# Patient Record
Sex: MALE | Race: WHITE | URBAN - METROPOLITAN AREA
[De-identification: names, ages, dates, MRNs, and addresses within clinical notes are randomized per-mention and may not be internally consistent; named-entity substitution may affect disease eponyms.]

---

## 2020-07-25 ENCOUNTER — TELEPHONE ENCOUNTER (OUTPATIENT)
Dept: URBAN - METROPOLITAN AREA CLINIC 13 | Facility: CLINIC | Age: 45
End: 2020-07-25

## 2020-07-26 ENCOUNTER — TELEPHONE ENCOUNTER (OUTPATIENT)
Dept: URBAN - METROPOLITAN AREA CLINIC 13 | Facility: CLINIC | Age: 45
End: 2020-07-26

## 2020-07-26 RX ORDER — TESTOSTERONE 16.2 MG/G
GEL TRANSDERMAL
Qty: 150 | Refills: 0 | Status: ACTIVE | COMMUNITY
Start: 2020-04-08

## 2020-07-26 RX ORDER — POLYETHYLENE GLYCOL 3350, SODIUM CHLORIDE, SODIUM BICARBONATE AND POTASSIUM CHLORIDE WITH LEMON FLAVOR 420; 11.2; 5.72; 1.48 G/4L; G/4L; G/4L; G/4L
TAKE  ML AS DIRECTED MIX CONTENTS PER DIRECTIONS, BEGIN DRINKING 1/2 SOLUTION @ 5P, COMPLETE REMAINDER OF SOLUTION 6HR PRIOR TO PROCEDURE POWDER, FOR SOLUTION ORAL
Qty: 1 | Refills: 0 | Status: ACTIVE | COMMUNITY
Start: 2020-02-18

## 2020-07-26 RX ORDER — TESTOSTERONE 16.2 MG/G
GEL TRANSDERMAL
Qty: 75 | Refills: 0 | Status: ACTIVE | COMMUNITY
Start: 2020-02-10

## 2020-07-26 RX ORDER — ERGOCALCIFEROL 1.25 MG/1
TAKE 1 CAPULE ONCE MONTHLY CAPSULE ORAL
Refills: 0 | Status: ACTIVE | COMMUNITY
Start: 2020-02-04

## 2020-07-26 RX ORDER — TESTOSTERONE 16.2 MG/G
GEL TRANSDERMAL
Qty: 75 | Refills: 0 | Status: ACTIVE | COMMUNITY
Start: 2020-03-10

## 2020-07-26 RX ORDER — CHLORHEXIDINE GLUCONATE 4 %
TAKE 1 TABLET DAILY AS DIRECTED LIQUID (ML) TOPICAL
Refills: 0 | Status: ACTIVE | COMMUNITY
Start: 2020-02-04

## 2021-08-20 ENCOUNTER — OFFICE VISIT (OUTPATIENT)
Dept: URBAN - METROPOLITAN AREA CLINIC 107 | Facility: CLINIC | Age: 46
End: 2021-08-20

## 2021-09-23 ENCOUNTER — OFFICE VISIT (OUTPATIENT)
Dept: URBAN - METROPOLITAN AREA CLINIC 107 | Facility: CLINIC | Age: 46
End: 2021-09-23

## 2021-09-23 RX ORDER — POLYETHYLENE GLYCOL 3350, SODIUM CHLORIDE, SODIUM BICARBONATE AND POTASSIUM CHLORIDE WITH LEMON FLAVOR 420; 11.2; 5.72; 1.48 G/4L; G/4L; G/4L; G/4L
TAKE  ML AS DIRECTED MIX CONTENTS PER DIRECTIONS, BEGIN DRINKING 1/2 SOLUTION @ 5P, COMPLETE REMAINDER OF SOLUTION 6HR PRIOR TO PROCEDURE POWDER, FOR SOLUTION ORAL
Qty: 1 | Refills: 0 | Status: ACTIVE | COMMUNITY
Start: 2020-02-18

## 2021-09-23 RX ORDER — CHLORHEXIDINE GLUCONATE 4 %
TAKE 1 TABLET DAILY AS DIRECTED LIQUID (ML) TOPICAL
Refills: 0 | Status: ACTIVE | COMMUNITY
Start: 2020-02-04

## 2021-09-23 RX ORDER — ERGOCALCIFEROL 1.25 MG/1
TAKE 1 CAPULE ONCE MONTHLY CAPSULE ORAL
Refills: 0 | Status: ACTIVE | COMMUNITY
Start: 2020-02-04

## 2021-09-23 RX ORDER — TESTOSTERONE 16.2 MG/G
GEL TRANSDERMAL
Qty: 75 | Refills: 0 | Status: ACTIVE | COMMUNITY
Start: 2020-02-10

## 2021-10-26 ENCOUNTER — OFFICE VISIT (OUTPATIENT)
Dept: URBAN - METROPOLITAN AREA CLINIC 113 | Facility: CLINIC | Age: 46
End: 2021-10-26
Payer: COMMERCIAL

## 2021-10-26 ENCOUNTER — WEB ENCOUNTER (OUTPATIENT)
Dept: URBAN - METROPOLITAN AREA CLINIC 113 | Facility: CLINIC | Age: 46
End: 2021-10-26

## 2021-10-26 ENCOUNTER — TELEPHONE ENCOUNTER (OUTPATIENT)
Dept: URBAN - METROPOLITAN AREA CLINIC 113 | Facility: CLINIC | Age: 46
End: 2021-10-26

## 2021-10-26 ENCOUNTER — LAB OUTSIDE AN ENCOUNTER (OUTPATIENT)
Dept: URBAN - METROPOLITAN AREA CLINIC 113 | Facility: CLINIC | Age: 46
End: 2021-10-26

## 2021-10-26 VITALS
WEIGHT: 254 LBS | SYSTOLIC BLOOD PRESSURE: 147 MMHG | BODY MASS INDEX: 37.62 KG/M2 | TEMPERATURE: 97.7 F | HEIGHT: 69 IN | DIASTOLIC BLOOD PRESSURE: 92 MMHG | HEART RATE: 89 BPM

## 2021-10-26 DIAGNOSIS — Z12.11 COLON CANCER SCREENING: ICD-10-CM

## 2021-10-26 DIAGNOSIS — R19.4 CHANGE IN BOWEL HABIT: ICD-10-CM

## 2021-10-26 DIAGNOSIS — Z80.0 FAMILY HISTORY OF COLON CANCER: ICD-10-CM

## 2021-10-26 PROCEDURE — 99203 OFFICE O/P NEW LOW 30 MIN: CPT | Performed by: INTERNAL MEDICINE

## 2021-10-26 RX ORDER — POLYETHYLENE GLYCOL 3350, SODIUM CHLORIDE, SODIUM BICARBONATE AND POTASSIUM CHLORIDE WITH LEMON FLAVOR 420; 11.2; 5.72; 1.48 G/4L; G/4L; G/4L; G/4L
TAKE  ML AS DIRECTED MIX CONTENTS PER DIRECTIONS, BEGIN DRINKING 1/2 SOLUTION @ 5P, COMPLETE REMAINDER OF SOLUTION 6HR PRIOR TO PROCEDURE POWDER, FOR SOLUTION ORAL
Qty: 1 | Refills: 0 | Status: ACTIVE | COMMUNITY
Start: 2020-02-18

## 2021-10-26 RX ORDER — ERGOCALCIFEROL 1.25 MG/1
TAKE 1 CAPULE ONCE MONTHLY CAPSULE ORAL
Refills: 0 | Status: ACTIVE | COMMUNITY
Start: 2020-02-04

## 2021-10-26 RX ORDER — TESTOSTERONE 16.2 MG/G
GEL TRANSDERMAL
Qty: 75 | Refills: 0 | Status: ACTIVE | COMMUNITY
Start: 2020-02-10

## 2021-10-26 RX ORDER — CHLORHEXIDINE GLUCONATE 4 %
TAKE 1 TABLET DAILY AS DIRECTED LIQUID (ML) TOPICAL
Refills: 0 | Status: ACTIVE | COMMUNITY
Start: 2020-02-04

## 2021-10-26 RX ORDER — SODIUM PICOSULFATE, MAGNESIUM OXIDE, AND ANHYDROUS CITRIC ACID 10; 3.5; 12 MG/160ML; G/160ML; G/160ML
160 ML LIQUID ORAL
Qty: 160 ML | Refills: 0 | OUTPATIENT
Start: 2021-10-26 | End: 2021-10-27

## 2021-10-26 NOTE — HPI-TODAY'S VISIT:
The patient is a very delightful 46-year-old gentleman who comes in with 1 year of intermittent bowel changes.  He feels a generalized pressure to his abdomen along with occasional difficulty urinating before he has a bowel movement.  He is very concerned because he has a strong family history of colon cancer as his sister had colon cancer at a young age.  She is younger than he is.  He has never had a colonoscopy before.  His stools alternate between normal and small hard balls.  His weight has been going up.  He is gained about 40 pounds in the last 2 years.

## 2021-11-01 ENCOUNTER — OFFICE VISIT (OUTPATIENT)
Dept: URBAN - METROPOLITAN AREA SURGERY CENTER 25 | Facility: SURGERY CENTER | Age: 46
End: 2021-11-01
Payer: COMMERCIAL

## 2021-11-01 ENCOUNTER — CLAIMS CREATED FROM THE CLAIM WINDOW (OUTPATIENT)
Dept: URBAN - METROPOLITAN AREA CLINIC 4 | Facility: CLINIC | Age: 46
End: 2021-11-01
Payer: COMMERCIAL

## 2021-11-01 DIAGNOSIS — Z80.0 FAMILY HISTORY OF COLON CANCER: ICD-10-CM

## 2021-11-01 DIAGNOSIS — D12.4 BENIGN NEOPLASM OF DESCENDING COLON: ICD-10-CM

## 2021-11-01 DIAGNOSIS — Z12.11 COLON CANCER SCREENING: ICD-10-CM

## 2021-11-01 DIAGNOSIS — D12.4 ADENOMA OF DESCENDING COLON: ICD-10-CM

## 2021-11-01 DIAGNOSIS — D12.3 BENIGN NEOPLASM OF TRANSVERSE COLON: ICD-10-CM

## 2021-11-01 DIAGNOSIS — D12.3 ADENOMA OF TRANSVERSE COLON: ICD-10-CM

## 2021-11-01 PROCEDURE — 88305 TISSUE EXAM BY PATHOLOGIST: CPT | Performed by: PATHOLOGY

## 2021-11-01 PROCEDURE — G8907 PT DOC NO EVENTS ON DISCHARG: HCPCS | Performed by: INTERNAL MEDICINE

## 2021-11-01 PROCEDURE — 45385 COLONOSCOPY W/LESION REMOVAL: CPT | Performed by: INTERNAL MEDICINE

## 2021-11-01 RX ORDER — CHLORHEXIDINE GLUCONATE 4 %
TAKE 1 TABLET DAILY AS DIRECTED LIQUID (ML) TOPICAL
Refills: 0 | Status: ACTIVE | COMMUNITY
Start: 2020-02-04

## 2021-11-01 RX ORDER — ERGOCALCIFEROL 1.25 MG/1
TAKE 1 CAPULE ONCE MONTHLY CAPSULE ORAL
Refills: 0 | Status: ACTIVE | COMMUNITY
Start: 2020-02-04

## 2021-11-01 RX ORDER — POLYETHYLENE GLYCOL 3350, SODIUM CHLORIDE, SODIUM BICARBONATE AND POTASSIUM CHLORIDE WITH LEMON FLAVOR 420; 11.2; 5.72; 1.48 G/4L; G/4L; G/4L; G/4L
TAKE  ML AS DIRECTED MIX CONTENTS PER DIRECTIONS, BEGIN DRINKING 1/2 SOLUTION @ 5P, COMPLETE REMAINDER OF SOLUTION 6HR PRIOR TO PROCEDURE POWDER, FOR SOLUTION ORAL
Qty: 1 | Refills: 0 | Status: ACTIVE | COMMUNITY
Start: 2020-02-18

## 2021-11-01 RX ORDER — TESTOSTERONE 16.2 MG/G
GEL TRANSDERMAL
Qty: 75 | Refills: 0 | Status: ACTIVE | COMMUNITY
Start: 2020-02-10

## 2022-01-06 ENCOUNTER — DASHBOARD ENCOUNTERS (OUTPATIENT)
Age: 47
End: 2022-01-06

## 2022-01-06 ENCOUNTER — OFFICE VISIT (OUTPATIENT)
Dept: URBAN - METROPOLITAN AREA CLINIC 113 | Facility: CLINIC | Age: 47
End: 2022-01-06
Payer: COMMERCIAL

## 2022-01-06 VITALS
SYSTOLIC BLOOD PRESSURE: 146 MMHG | TEMPERATURE: 98.1 F | HEIGHT: 69 IN | HEART RATE: 66 BPM | WEIGHT: 259 LBS | RESPIRATION RATE: 18 BRPM | BODY MASS INDEX: 38.36 KG/M2 | DIASTOLIC BLOOD PRESSURE: 86 MMHG

## 2022-01-06 DIAGNOSIS — K63.5 POLYP OF HEPATIC FLEXURE OF COLON: ICD-10-CM

## 2022-01-06 DIAGNOSIS — Z80.0 FAMILY HISTORY OF COLON CANCER: ICD-10-CM

## 2022-01-06 DIAGNOSIS — D12.4 ADENOMATOUS POLYP OF DESCENDING COLON: ICD-10-CM

## 2022-01-06 DIAGNOSIS — K57.30 DIVERTICULOSIS LARGE INTESTINE W/O PERFORATION OR ABSCESS W/O BLEEDING: ICD-10-CM

## 2022-01-06 DIAGNOSIS — R19.4 CHANGE IN BOWEL HABIT: ICD-10-CM

## 2022-01-06 PROBLEM — 724538004: Status: ACTIVE | Noted: 2022-01-06

## 2022-01-06 PROCEDURE — 99213 OFFICE O/P EST LOW 20 MIN: CPT | Performed by: INTERNAL MEDICINE

## 2022-01-06 RX ORDER — TESTOSTERONE 16.2 MG/G
GEL TRANSDERMAL
Qty: 75 | Refills: 0 | Status: DISCONTINUED | COMMUNITY
Start: 2020-02-10

## 2022-01-06 RX ORDER — ERGOCALCIFEROL 1.25 MG/1
TAKE 1 CAPULE ONCE MONTHLY CAPSULE ORAL
Refills: 0 | Status: DISCONTINUED | COMMUNITY
Start: 2020-02-04

## 2022-01-06 RX ORDER — POLYETHYLENE GLYCOL 3350, SODIUM CHLORIDE, SODIUM BICARBONATE AND POTASSIUM CHLORIDE WITH LEMON FLAVOR 420; 11.2; 5.72; 1.48 G/4L; G/4L; G/4L; G/4L
TAKE  ML AS DIRECTED MIX CONTENTS PER DIRECTIONS, BEGIN DRINKING 1/2 SOLUTION @ 5P, COMPLETE REMAINDER OF SOLUTION 6HR PRIOR TO PROCEDURE POWDER, FOR SOLUTION ORAL
Qty: 1 | Refills: 0 | Status: DISCONTINUED | COMMUNITY
Start: 2020-02-18

## 2022-01-06 RX ORDER — CHLORHEXIDINE GLUCONATE 4 %
TAKE 1 TABLET DAILY AS DIRECTED LIQUID (ML) TOPICAL
Refills: 0 | Status: DISCONTINUED | COMMUNITY
Start: 2020-02-04

## 2022-01-06 NOTE — HPI-TODAY'S VISIT:
The patient is a very delightful 46-year-old gentleman who comes in with 1 year of intermittent bowel changes.  He feels a generalized pressure to his abdomen along with occasional difficulty urinating before he has a bowel movement.  He is very concerned because he has a strong family history of colon cancer as his sister had colon cancer at a young age.  She is younger than he is.  He has never had a colonoscopy before.  His stools alternate between normal and small hard balls.  His weight has been going up.  He is gained about 40 pounds in the last 2 years. Interval history.  The patient underwent colonoscopy on November 1 of 2021.  This was for screening purposes especially with the family history of colon cancer in her sister before the age of 60 and it was also the patient's first colonoscopy.  Examination revealed a descending colon and hepatic flexure colon polyp.  He also had diverticulosis of the sigmoid colon.  Recommended 3-year follow-up due to the family history was given.  The polyps returned as tubular adenomas. The patient states he continues to have the alternating difficulty with bowel movements to postprandial urgency.  He states he is taking his fiber every day but only 1 tablespoon.  We discussed increasing this to 2 tablespoons a day.

## 2025-03-28 ENCOUNTER — TELEPHONE ENCOUNTER (OUTPATIENT)
Dept: URBAN - METROPOLITAN AREA CLINIC 107 | Facility: CLINIC | Age: 50
End: 2025-03-28

## 2025-03-31 ENCOUNTER — OFFICE VISIT (OUTPATIENT)
Dept: URBAN - METROPOLITAN AREA CLINIC 107 | Facility: CLINIC | Age: 50
End: 2025-03-31

## 2025-03-31 PROBLEM — 70342003: Status: ACTIVE | Noted: 2025-03-31

## 2025-03-31 NOTE — HPI-TODAY'S VISIT:
The patient is a very delightful 46-year-old gentleman who comes in with 1 year of intermittent bowel changes.  He feels a generalized pressure to his abdomen along with occasional difficulty urinating before he has a bowel movement.  He is very concerned because he has a strong family history of colon cancer as his sister had colon cancer at a young age.  She is younger than he is.  He has never had a colonoscopy before.  His stools alternate between normal and small hard balls.  His weight has been going up.  He is gained about 40 pounds in the last 2 years. Interval history.  The patient underwent colonoscopy on November 1 of 2021.  This was for screening purposes especially with the family history of colon cancer in her sister before the age of 60 and it was also the patient's first colonoscopy.  Examination revealed a descending colon and hepatic flexure colon polyp.  He also had diverticulosis of the sigmoid colon.  Recommended 3-year follow-up due to the family history was given.  The polyps returned as tubular adenomas. The patient states he continues to have the alternating difficulty with bowel movements to postprandial urgency.  He states he is taking his fiber every day but only 1 tablespoon.  We discussed increasing this to 2 tablespoons a day. Interval history, 3/31/2025. Patient returns today after long interval absence.  He was last seen slightly over 3 years ago. Referring records were reviewed. 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 Laboratory testing dated January 3, 2025 revealed a normal CBC, normal CMP except for glucose of 112 and ALT of 65.  Hemoglobin A1c was 6.1.  PSA was normal.

## 2025-04-22 ENCOUNTER — OFFICE VISIT (OUTPATIENT)
Dept: URBAN - METROPOLITAN AREA CLINIC 107 | Facility: CLINIC | Age: 50
End: 2025-04-22
Payer: COMMERCIAL

## 2025-04-22 DIAGNOSIS — Z86.0101 H/O ADENOMATOUS POLYP OF COLON: ICD-10-CM

## 2025-04-22 DIAGNOSIS — K57.30 DIVERTICULOSIS LARGE INTESTINE W/O PERFORATION OR ABSCESS W/O BLEEDING: ICD-10-CM

## 2025-04-22 DIAGNOSIS — R19.4 CHANGE IN BOWEL HABIT: ICD-10-CM

## 2025-04-22 DIAGNOSIS — Z80.0 FAMILY HISTORY OF COLON CANCER: ICD-10-CM

## 2025-04-22 PROCEDURE — 99213 OFFICE O/P EST LOW 20 MIN: CPT

## 2025-04-22 RX ORDER — SODIUM SULFATE, POTASSIUM SULFATE AND MAGNESIUM SULFATE 1.6; 3.13; 17.5 G/177ML; G/177ML; G/177ML
354 ML SOLUTION ORAL ONCE
Qty: 177 | Refills: 0 | OUTPATIENT
Start: 2025-04-22 | End: 2025-04-23

## 2025-04-22 NOTE — PHYSICAL EXAM NECK/THYROID:
Emergency Department Resident Note      Patient: Casper Medrano Age: 8 year old Sex: male   MRN: [id number] : 2013 Encounter Date: 3/15/2022       HISTORY OF PRESENT ILLNESS  This is a 8 year old male here for evaluation of abdominal pain, nausea and vomiting.  Presented as a transfer from outside hospital due to concern for acute mental status.  Patient states that yesterday morning he began having vomiting, abdominal pain centered in the right lower quadrant.  He describes this as sharp.  It does not radiate anywhere else.  Initially went to urgent care yesterday morning where he was discharged with Zofran but subsequently was unable to tolerate p.o. and went to outside hospital where they had an ultrasound report that they brought with them that showed a appendix that was 8 mm dilated, noncompressible with a fecalith and positive fat stranding.  Labs are with him also showed a WBC of 20,000.  Here he is still complaining of nausea and right lower quadrant pain.  They deny fevers, diarrhea, dysuria, chest pain, shortness of breath.    REVIEW OF SYSTEMS  Review of Systems   Constitutional: Negative for fever.   HENT: Negative for sore throat.    Eyes: Negative for redness.   Respiratory: Negative for chest tightness and shortness of breath.    Cardiovascular: Negative for chest pain.   Gastrointestinal: Positive for abdominal pain, nausea and vomiting. Negative for constipation and diarrhea.   Genitourinary: Negative for dysuria.   Musculoskeletal: Negative for gait problem.   Skin: Negative for rash.   Neurological: Negative for headaches.   Psychiatric/Behavioral: Negative for agitation.        PAST HISTORY       No past medical history on file. No past surgical history on file.          Social History     Tobacco Use   • Smoking status: Never Smoker   • Smokeless tobacco: Never Used   Vaping Use   • Vaping Use: never used   Substance Use Topics   • Alcohol use: Never   • Drug use: Not on file     normal appearance Family History   Problem Relation Age of Onset   • Hypertension Mother    • Hypertension Maternal Grandmother    • Stroke Maternal Grandfather    • Hypertension Maternal Grandfather    • COPD Maternal Grandfather    • Heart disease Paternal Grandfather    • Stroke Paternal Grandmother    • Hypertension Paternal Grandmother              Prior to Admission medications    Medication Sig Start Date End Date Taking? Authorizing Provider   cetirizine (ZyrTEC) 10 MG tablet Take 10 mg by mouth daily.   Yes Outside Provider   fluticasone (FLONASE) 50 MCG/ACT nasal spray    Yes Outside Provider   EPINEPHRINE, ANAPHYLAXIS, IJ    Yes Outside Provider   albuterol (ACCUNEB) 0.63 MG/3ML nebulizer solution Take 0.63 mg by nebulization every 6 hours as needed for Wheezing.   Yes Outside Provider   montelukast (SINGULAIR) 10 MG tablet Take 10 mg by mouth nightly.   Yes Outside Provider    Allergies   Allergen Reactions   • Cat Dander ANAPHYLAXIS   • Dog Dander ANAPHYLAXIS   • Seasonal ANAPHYLAXIS   • Dust Other (See Comments)     unknown   • Mold   (Environmental) Other (See Comments)     unknown   • Nuts   (Food) Other (See Comments)   • Pollen Other (See Comments)     unknown   • Ragweed Other (See Comments)     unknown   • Trees Other (See Comments)     unknown   • Wheat   (Food Or Med) Other (See Comments)     Blood test            Additional Information:     PHYSICAL EXAMINATION  Physical Exam  Constitutional:       General: He is not in acute distress.     Appearance: Normal appearance. He is normal weight. He is not toxic-appearing.   HENT:      Head: Normocephalic and atraumatic.      Right Ear: External ear normal.      Left Ear: External ear normal.      Nose: Nose normal. No congestion.      Mouth/Throat:      Mouth: Mucous membranes are moist.      Neck: Normal range of motion. No rigidity.   Eyes:      Extraocular Movements: Extraocular movements intact.      Conjunctiva/sclera: Conjunctivae normal.      Pupils: Pupils  are equal, round, and reactive to light.   Cardiovascular:      Rate and Rhythm: Normal rate and regular rhythm.      Pulses: Normal pulses.      Heart sounds: No murmur heard.  Pulmonary:      Effort: Pulmonary effort is normal. No respiratory distress.      Breath sounds: Normal breath sounds. No wheezing.   Abdominal:      General: Abdomen is flat. Bowel sounds are normal. There is no distension.      Palpations: Abdomen is soft.      Tenderness: There is abdominal tenderness in the right lower quadrant and suprapubic area. There is guarding. There is no rebound.   Musculoskeletal:         General: Normal range of motion.   Skin:     General: Skin is warm and dry.      Capillary Refill: Capillary refill takes less than 2 seconds.   Neurological:      General: No focal deficit present.      Mental Status: He is alert and oriented for age.   Psychiatric:         Mood and Affect: Mood normal.         Behavior: Behavior normal.       Blood pressure 104/62, pulse 92, temperature 98.2 °F (36.8 °C), temperature source Oral, resp. rate 24, height 4' 7.91\" (1.42 m), weight 43 kg (94 lb 12.8 oz), SpO2 100 %.    Labs:   Results for orders placed or performed during the hospital encounter of 03/15/22   COVID/Flu/RSV panel   Result Value Ref Range    Rapid SARS-COV-2 by PCR Not Detected Not Detected / Detected / Presumptive Positive / Inhibitors present    Influenza A by PCR Not Detected Not Detected    Influenza B by PCR Not Detected Not Detected    RSV BY PCR Not Detected Not Detected    Isolation Guidelines      Procedural Comment       Imaging:   US OUTSIDE STUDY   Final Result      XRAY OUTSIDE STUDY   Final Result        Procedures      MEDICAL DECISION MAKING  This is a 8 year old male with no seen past medical history who presents for abdominal pain concerning for acute mental status and outside ultrasound.  Will upload dose here and consult general surgery regarding operative intervention in light of large amount of  dilation.  Patient was already started on antibiotics and will continue with Zosyn Flagyl that was started.  We will keep n.p.o.    ED Course as of 03/15/22 1450   Tue Mar 15, 2022   0830 Surgery NP, Alyssa Burch paged for acute appendicitis. Images given to tech to upload  [KB]   1958 Surgery recommends OR intervention for appendectomy.  Patient signed out to hospitalist service for admission postop. [JM]      ED Course User Index  [JM] Angella Weinberg MD  [KB] Kenyatta Coley     ED Medication Orders (From admission, onward)    Ordered Start     Status Ordering Provider    03/15/22 0830 03/15/22 0845  acetaminophen (TYLENOL) 160 MG/5ML suspension 649.6 mg  ONCE         Last MAR action: Given ANGELLA WEINBERG          IMPRESSION AND PLAN  Diagnosis:   1. Acute appendicitis with localized peritonitis, without perforation, abscess, or gangrene      Disposition: Admit Inpatient Floor     I participated in the care of this patient and this note provides additional information during the visit.  Please refer to the attending note for further details regarding history, physical exam, work-up, medical decision making, and disposition.  This note was generated using voice recognition software.     Kenyatta Coley  Emergency Medicine, PGY1  [employer]:      Kenyatta Coley  Resident  03/15/22 [id number]

## 2025-05-28 ENCOUNTER — TELEPHONE ENCOUNTER (OUTPATIENT)
Dept: URBAN - METROPOLITAN AREA CLINIC 107 | Facility: CLINIC | Age: 50
End: 2025-05-28

## 2025-05-29 ENCOUNTER — TELEPHONE ENCOUNTER (OUTPATIENT)
Dept: URBAN - METROPOLITAN AREA SURGERY CENTER 25 | Facility: SURGERY CENTER | Age: 50
End: 2025-05-29

## 2025-06-02 ENCOUNTER — OFFICE VISIT (OUTPATIENT)
Dept: URBAN - METROPOLITAN AREA SURGERY CENTER 25 | Facility: SURGERY CENTER | Age: 50
End: 2025-06-02

## 2025-06-09 ENCOUNTER — TELEPHONE ENCOUNTER (OUTPATIENT)
Dept: URBAN - METROPOLITAN AREA CLINIC 113 | Facility: CLINIC | Age: 50
End: 2025-06-09

## 2025-06-30 ENCOUNTER — TELEPHONE ENCOUNTER (OUTPATIENT)
Dept: URBAN - METROPOLITAN AREA CLINIC 113 | Facility: CLINIC | Age: 50
End: 2025-06-30

## 2025-07-01 ENCOUNTER — TELEPHONE ENCOUNTER (OUTPATIENT)
Dept: URBAN - METROPOLITAN AREA CLINIC 113 | Facility: CLINIC | Age: 50
End: 2025-07-01

## 2025-07-01 RX ORDER — SODIUM, POTASSIUM,MAG SULFATES 17.5-3.13G
354 ML SOLUTION, RECONSTITUTED, ORAL ORAL ONCE
Qty: 354 MILLILITER | Refills: 0 | OUTPATIENT
Start: 2025-07-02 | End: 2025-07-03

## 2025-07-08 ENCOUNTER — CLAIMS CREATED FROM THE CLAIM WINDOW (OUTPATIENT)
Dept: URBAN - METROPOLITAN AREA CLINIC 4 | Facility: CLINIC | Age: 50
End: 2025-07-08
Payer: COMMERCIAL

## 2025-07-08 ENCOUNTER — CLAIMS CREATED FROM THE CLAIM WINDOW (OUTPATIENT)
Dept: URBAN - METROPOLITAN AREA SURGERY CENTER 25 | Facility: SURGERY CENTER | Age: 50
End: 2025-07-08
Payer: COMMERCIAL

## 2025-07-08 DIAGNOSIS — E66.01 MORBID (SEVERE) OBESITY DUE TO EXCESS CALORIES: ICD-10-CM

## 2025-07-08 DIAGNOSIS — Z86.0101 H/O ADENOMATOUS POLYP OF COLON: ICD-10-CM

## 2025-07-08 DIAGNOSIS — Z09 ENCOUNTER FOR FOLLOW-UP EXAMINATION AFTER COMPLETED TREATMENT FOR CONDITIONS OTHER THAN MALIGNANT NEOPLASM: ICD-10-CM

## 2025-07-08 DIAGNOSIS — K63.5 BENIGN COLON POLYP: ICD-10-CM

## 2025-07-08 DIAGNOSIS — Z09 ENCNTR FOR F/U EXAM AFT TRTMT FOR COND OTH THAN MALIG NEOPLM: ICD-10-CM

## 2025-07-08 DIAGNOSIS — Z86.0101 PERSONAL HISTORY OF ADENOMATOUS AND SERRATED COLON POLYPS: ICD-10-CM

## 2025-07-08 DIAGNOSIS — Z80.0 FAMILY HISTORY OF MALIGNANT NEOPLASM OF DIGESTIVE ORGANS: ICD-10-CM

## 2025-07-08 DIAGNOSIS — K63.89 OTHER SPECIFIED DISEASES OF INTESTINE: ICD-10-CM

## 2025-07-08 PROCEDURE — 88305 TISSUE EXAM BY PATHOLOGIST: CPT | Performed by: PATHOLOGY

## 2025-07-08 PROCEDURE — 0529F INTRVL 3/>YR PTS CLNSCP DOCD: CPT | Performed by: INTERNAL MEDICINE

## 2025-07-08 PROCEDURE — 00811 ANES LWR INTST NDSC NOS: CPT | Performed by: NURSE ANESTHETIST, CERTIFIED REGISTERED

## 2025-07-08 PROCEDURE — 45385 COLONOSCOPY W/LESION REMOVAL: CPT | Performed by: INTERNAL MEDICINE
